# Patient Record
Sex: FEMALE | Race: BLACK OR AFRICAN AMERICAN | NOT HISPANIC OR LATINO | Employment: UNEMPLOYED | URBAN - METROPOLITAN AREA
[De-identification: names, ages, dates, MRNs, and addresses within clinical notes are randomized per-mention and may not be internally consistent; named-entity substitution may affect disease eponyms.]

---

## 2020-08-17 ENCOUNTER — TELEPHONE (OUTPATIENT)
Dept: NEUROLOGY | Facility: CLINIC | Age: 35
End: 2020-08-17

## 2020-08-17 NOTE — TELEPHONE ENCOUNTER
----- Message from Robert Nieves sent at 8/14/2020  4:59 PM CDT -----  Regarding: appointment access  Ms. Cosby called in to speak with someone at the office regarding scheduling an appointment. She would like a call back from the office and can be reached at    [phone]

## 2020-12-29 ENCOUNTER — OFFICE VISIT (OUTPATIENT)
Dept: NEUROLOGY | Facility: CLINIC | Age: 35
End: 2020-12-29
Payer: MEDICAID

## 2020-12-29 VITALS
BODY MASS INDEX: 23.82 KG/M2 | DIASTOLIC BLOOD PRESSURE: 82 MMHG | WEIGHT: 129.44 LBS | HEIGHT: 62 IN | HEART RATE: 72 BPM | RESPIRATION RATE: 16 BRPM | SYSTOLIC BLOOD PRESSURE: 116 MMHG

## 2020-12-29 DIAGNOSIS — G93.5 CHIARI MALFORMATION TYPE I: Primary | ICD-10-CM

## 2020-12-29 DIAGNOSIS — M62.838 CERVICAL PARASPINAL MUSCLE SPASM: ICD-10-CM

## 2020-12-29 PROCEDURE — 99204 OFFICE O/P NEW MOD 45 MIN: CPT | Mod: S$PBB,,, | Performed by: PSYCHIATRY & NEUROLOGY

## 2020-12-29 PROCEDURE — 99999 PR PBB SHADOW E&M-EST. PATIENT-LVL IV: CPT | Mod: PBBFAC,,, | Performed by: PSYCHIATRY & NEUROLOGY

## 2020-12-29 PROCEDURE — 99214 OFFICE O/P EST MOD 30 MIN: CPT | Mod: PBBFAC | Performed by: PSYCHIATRY & NEUROLOGY

## 2020-12-29 PROCEDURE — 99204 PR OFFICE/OUTPT VISIT, NEW, LEVL IV, 45-59 MIN: ICD-10-PCS | Mod: S$PBB,,, | Performed by: PSYCHIATRY & NEUROLOGY

## 2020-12-29 PROCEDURE — 99999 PR PBB SHADOW E&M-EST. PATIENT-LVL IV: ICD-10-PCS | Mod: PBBFAC,,, | Performed by: PSYCHIATRY & NEUROLOGY

## 2020-12-29 RX ORDER — ACETAMINOPHEN AND CODEINE PHOSPHATE 120; 12 MG/5ML; MG/5ML
1 SOLUTION ORAL DAILY
COMMUNITY

## 2020-12-29 NOTE — PATIENT INSTRUCTIONS
Neck Spasm     A spasm of the neck muscles can happen after a sudden awkward neck movement. Sleeping with your neck in a crooked position can also cause spasm. Some people respond to emotional stress by tensing the muscles of their neck, shoulders, and upper back. If neck spasm lasts long enough, it can cause headache.  The treatment described below will usually help the pain to go away in 5 to 7 days. Pain that continues may need further evaluation or other types of treatment such as physical therapy.  Home care  · Rest and relax the muscles. Use a comfortable pillow that supports the head and keeps the spine in a neutral position. The position of the head should not be tilted forward or backward. A rolled up towel may help for a custom fit.  · Some people find relief with heat. Heat can be applied with either a warm shower or bath or a moist towel heated in the microwave and massage. Others prefer cold packs. You can make an ice pack by filling a plastic bag that seals at the top with ice cubes or crushed ice and then wrapping it with a thin towel. Try both and use the method that feels best for 15 to 20 minutes, several times a day.  · Whether using ice or heat, be careful that you do not injure your skin. Never put ice directly on the skin. Always wrap the ice in a towel or other type of cloth. This is very important, especially in people with poor skin sensations.  · Try to reduce your stress level. Emotional stress can lead to neck muscle tension and get in the way of or delay the healing process.  · You may use over-the-counter pain medicine to control pain, unless another medicine was prescribed.If you have chronic liver or kidney disease or ever had a stomach ulcer or GI bleeding, talk with your healthcare provider before using these medicines.  Follow-up care  Follow up with your healthcare provider if your symptoms do not show signs of improvement after one week. Physical therapy or further tests may be  needed.  If X-rays, CT scans, or MRI scans were taken, you will be told of any new findings that may affect your care.  Call 911  Call 911 if you have:  · Sudden weakness or numbness in one or both arms  · Neck swelling, difficulty or painful swallowing  · Difficulty breathing  · Chest pain  When to seek medical advice  Call your healthcare provider right away if any of these occur:  · Pain becomes worse or spreads into one or both arms  · Increasing headache with nausea or vomiting  · Fever of 100.4°F (38°C) or above lasting for 24 to 48 hours  Date Last Reviewed: 11/21/2015  © 9128-6646 [employer]. [address] [place] [address], [place] [address]. All rights reserved. This information is not intended as a substitute for professional medical care. Always follow your healthcare professional's instructions.
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
## 2021-04-28 ENCOUNTER — PATIENT MESSAGE (OUTPATIENT)
Dept: RESEARCH | Facility: HOSPITAL | Age: 36
End: 2021-04-28

## 2021-12-06 DIAGNOSIS — M25.539 PAIN IN WRIST, UNSPECIFIED LATERALITY: Primary | ICD-10-CM

## 2021-12-08 ENCOUNTER — OFFICE VISIT (OUTPATIENT)
Dept: ORTHOPEDICS | Facility: CLINIC | Age: 36
End: 2021-12-08
Payer: MEDICAID

## 2021-12-08 ENCOUNTER — TELEPHONE (OUTPATIENT)
Dept: PHYSICAL MEDICINE AND REHAB | Facility: CLINIC | Age: 36
End: 2021-12-08
Payer: MEDICAID

## 2021-12-08 ENCOUNTER — HOSPITAL ENCOUNTER (OUTPATIENT)
Dept: RADIOLOGY | Facility: HOSPITAL | Age: 36
Discharge: HOME | End: 2021-12-08
Attending: STUDENT IN AN ORGANIZED HEALTH CARE EDUCATION/TRAINING PROGRAM
Payer: MEDICAID

## 2021-12-08 VITALS — BODY MASS INDEX: 23.82 KG/M2 | RESPIRATION RATE: 20 BRPM | HEIGHT: 62 IN | WEIGHT: 129.44 LBS

## 2021-12-08 DIAGNOSIS — M67.431 GANGLION CYST OF VOLAR ASPECT OF RIGHT WRIST: ICD-10-CM

## 2021-12-08 DIAGNOSIS — M25.539 PAIN IN WRIST, UNSPECIFIED LATERALITY: ICD-10-CM

## 2021-12-08 DIAGNOSIS — G56.01 CARPAL TUNNEL SYNDROME OF RIGHT WRIST: Primary | ICD-10-CM

## 2021-12-08 PROCEDURE — 99204 OFFICE O/P NEW MOD 45 MIN: CPT | Mod: S$PBB,,, | Performed by: STUDENT IN AN ORGANIZED HEALTH CARE EDUCATION/TRAINING PROGRAM

## 2021-12-08 PROCEDURE — 99999 PR PBB SHADOW E&M-EST. PATIENT-LVL III: ICD-10-PCS | Mod: PBBFAC,,, | Performed by: STUDENT IN AN ORGANIZED HEALTH CARE EDUCATION/TRAINING PROGRAM

## 2021-12-08 PROCEDURE — 73110 X-RAY EXAM OF WRIST: CPT | Mod: 26,RT,, | Performed by: RADIOLOGY

## 2021-12-08 PROCEDURE — 99213 OFFICE O/P EST LOW 20 MIN: CPT | Mod: PBBFAC,PO | Performed by: STUDENT IN AN ORGANIZED HEALTH CARE EDUCATION/TRAINING PROGRAM

## 2021-12-08 PROCEDURE — 73110 X-RAY EXAM OF WRIST: CPT | Mod: TC,PO,RT

## 2021-12-08 PROCEDURE — 99999 PR PBB SHADOW E&M-EST. PATIENT-LVL III: CPT | Mod: PBBFAC,,, | Performed by: STUDENT IN AN ORGANIZED HEALTH CARE EDUCATION/TRAINING PROGRAM

## 2021-12-08 PROCEDURE — 73110 XR WRIST COMPLETE 3 VIEWS RIGHT: ICD-10-PCS | Mod: 26,RT,, | Performed by: RADIOLOGY

## 2021-12-08 PROCEDURE — 99204 PR OFFICE/OUTPT VISIT, NEW, LEVL IV, 45-59 MIN: ICD-10-PCS | Mod: S$PBB,,, | Performed by: STUDENT IN AN ORGANIZED HEALTH CARE EDUCATION/TRAINING PROGRAM

## 2021-12-20 ENCOUNTER — OFFICE VISIT (OUTPATIENT)
Dept: PHYSICAL MEDICINE AND REHAB | Facility: CLINIC | Age: 36
End: 2021-12-20
Payer: MEDICAID

## 2021-12-20 VITALS
WEIGHT: 129 LBS | BODY MASS INDEX: 23.74 KG/M2 | SYSTOLIC BLOOD PRESSURE: 119 MMHG | HEIGHT: 62 IN | HEART RATE: 66 BPM | DIASTOLIC BLOOD PRESSURE: 83 MMHG | RESPIRATION RATE: 14 BRPM

## 2021-12-20 DIAGNOSIS — M79.18 CERVICAL MYOFASCIAL PAIN SYNDROME: ICD-10-CM

## 2021-12-20 DIAGNOSIS — R20.2 PARESTHESIAS: ICD-10-CM

## 2021-12-20 PROCEDURE — 99203 PR OFFICE/OUTPT VISIT, NEW, LEVL III, 30-44 MIN: ICD-10-PCS | Mod: 25,S$PBB,, | Performed by: PHYSICAL MEDICINE & REHABILITATION

## 2021-12-20 PROCEDURE — 95912 PR NERVE CONDUCTION STUDY; 11 -12 STUDIES: ICD-10-PCS | Mod: 26,S$PBB,, | Performed by: PHYSICAL MEDICINE & REHABILITATION

## 2021-12-20 PROCEDURE — 99203 OFFICE O/P NEW LOW 30 MIN: CPT | Mod: 25,S$PBB,, | Performed by: PHYSICAL MEDICINE & REHABILITATION

## 2021-12-20 PROCEDURE — 99999 PR PBB SHADOW E&M-EST. PATIENT-LVL III: ICD-10-PCS | Mod: PBBFAC,,, | Performed by: PHYSICAL MEDICINE & REHABILITATION

## 2021-12-20 PROCEDURE — 95912 NRV CNDJ TEST 11-12 STUDIES: CPT | Mod: PBBFAC | Performed by: PHYSICAL MEDICINE & REHABILITATION

## 2021-12-20 PROCEDURE — 95912 NRV CNDJ TEST 11-12 STUDIES: CPT | Mod: 26,S$PBB,, | Performed by: PHYSICAL MEDICINE & REHABILITATION

## 2021-12-20 PROCEDURE — 99213 OFFICE O/P EST LOW 20 MIN: CPT | Mod: PBBFAC | Performed by: PHYSICAL MEDICINE & REHABILITATION

## 2021-12-20 PROCEDURE — 99999 PR PBB SHADOW E&M-EST. PATIENT-LVL III: CPT | Mod: PBBFAC,,, | Performed by: PHYSICAL MEDICINE & REHABILITATION

## 2021-12-20 RX ORDER — AZELASTINE 1 MG/ML
1 SPRAY, METERED NASAL
COMMUNITY
Start: 2021-11-05

## 2021-12-20 RX ORDER — LEVOCETIRIZINE DIHYDROCHLORIDE 5 MG/1
5 TABLET, FILM COATED ORAL NIGHTLY
COMMUNITY
Start: 2021-11-05

## 2021-12-20 RX ORDER — FLUTICASONE PROPIONATE 50 MCG
2 SPRAY, SUSPENSION (ML) NASAL DAILY
COMMUNITY
Start: 2021-11-05

## 2023-09-26 ENCOUNTER — PATIENT MESSAGE (OUTPATIENT)
Dept: SPORTS MEDICINE | Facility: CLINIC | Age: 38
End: 2023-09-26
Payer: MEDICAID

## 2023-09-26 ENCOUNTER — TELEPHONE (OUTPATIENT)
Dept: SPORTS MEDICINE | Facility: CLINIC | Age: 38
End: 2023-09-26
Payer: MEDICAID

## 2023-09-26 NOTE — TELEPHONE ENCOUNTER
Called pt, got her scheduled with Dr. Harper as requested. Pt verbally understood and accepted appointment.   ----- Message from Elsy Bruce sent at 9/26/2023 10:06 AM CDT -----  Contact: LION [phone]  Patient is returning a phone call.    Who left a message for the patient: --Nurse--    Does patient know what this is regarding: --appointment--    Would you like a call back, or a response through your [facility] portal?:  --Call back--    Comments: Pt states that she seen the doctor before. Please call to advise.

## 2023-10-02 DIAGNOSIS — M25.561 RIGHT KNEE PAIN, UNSPECIFIED CHRONICITY: Primary | ICD-10-CM

## 2023-10-04 ENCOUNTER — OFFICE VISIT (OUTPATIENT)
Dept: SPORTS MEDICINE | Facility: CLINIC | Age: 38
End: 2023-10-04
Payer: MEDICAID

## 2023-10-04 ENCOUNTER — HOSPITAL ENCOUNTER (OUTPATIENT)
Dept: RADIOLOGY | Facility: HOSPITAL | Age: 38
Discharge: HOME | End: 2023-10-04
Attending: STUDENT IN AN ORGANIZED HEALTH CARE EDUCATION/TRAINING PROGRAM
Payer: MEDICAID

## 2023-10-04 VITALS — BODY MASS INDEX: 23.74 KG/M2 | WEIGHT: 129 LBS | HEIGHT: 62 IN

## 2023-10-04 DIAGNOSIS — M25.561 RIGHT KNEE PAIN, UNSPECIFIED CHRONICITY: ICD-10-CM

## 2023-10-04 DIAGNOSIS — R29.898 WEAKNESS OF BOTH HIPS: ICD-10-CM

## 2023-10-04 DIAGNOSIS — M22.2X1 PATELLOFEMORAL DISORDER, RIGHT: Primary | ICD-10-CM

## 2023-10-04 PROCEDURE — 73562 XR KNEE ORTHO RIGHT WITH FLEXION: ICD-10-PCS | Mod: 26,LT,, | Performed by: RADIOLOGY

## 2023-10-04 PROCEDURE — 1159F PR MEDICATION LIST DOCUMENTED IN MEDICAL RECORD: ICD-10-PCS | Mod: CPTII,,, | Performed by: STUDENT IN AN ORGANIZED HEALTH CARE EDUCATION/TRAINING PROGRAM

## 2023-10-04 PROCEDURE — 73564 X-RAY EXAM KNEE 4 OR MORE: CPT | Mod: TC,RT

## 2023-10-04 PROCEDURE — 73564 X-RAY EXAM KNEE 4 OR MORE: CPT | Mod: 26,RT,, | Performed by: RADIOLOGY

## 2023-10-04 PROCEDURE — 73562 X-RAY EXAM OF KNEE 3: CPT | Mod: 26,LT,, | Performed by: RADIOLOGY

## 2023-10-04 PROCEDURE — 1159F MED LIST DOCD IN RCRD: CPT | Mod: CPTII,,, | Performed by: STUDENT IN AN ORGANIZED HEALTH CARE EDUCATION/TRAINING PROGRAM

## 2023-10-04 PROCEDURE — 99213 OFFICE O/P EST LOW 20 MIN: CPT | Mod: PBBFAC | Performed by: STUDENT IN AN ORGANIZED HEALTH CARE EDUCATION/TRAINING PROGRAM

## 2023-10-04 PROCEDURE — 99214 OFFICE O/P EST MOD 30 MIN: CPT | Mod: S$PBB,,, | Performed by: STUDENT IN AN ORGANIZED HEALTH CARE EDUCATION/TRAINING PROGRAM

## 2023-10-04 PROCEDURE — 99214 PR OFFICE/OUTPT VISIT, EST, LEVL IV, 30-39 MIN: ICD-10-PCS | Mod: S$PBB,,, | Performed by: STUDENT IN AN ORGANIZED HEALTH CARE EDUCATION/TRAINING PROGRAM

## 2023-10-04 PROCEDURE — 3008F PR BODY MASS INDEX (BMI) DOCUMENTED: ICD-10-PCS | Mod: CPTII,,, | Performed by: STUDENT IN AN ORGANIZED HEALTH CARE EDUCATION/TRAINING PROGRAM

## 2023-10-04 PROCEDURE — 99999 PR PBB SHADOW E&M-EST. PATIENT-LVL III: ICD-10-PCS | Mod: PBBFAC,,, | Performed by: STUDENT IN AN ORGANIZED HEALTH CARE EDUCATION/TRAINING PROGRAM

## 2023-10-04 PROCEDURE — 3008F BODY MASS INDEX DOCD: CPT | Mod: CPTII,,, | Performed by: STUDENT IN AN ORGANIZED HEALTH CARE EDUCATION/TRAINING PROGRAM

## 2023-10-04 PROCEDURE — 73564 XR KNEE ORTHO RIGHT WITH FLEXION: ICD-10-PCS | Mod: 26,RT,, | Performed by: RADIOLOGY

## 2023-10-04 PROCEDURE — 99999 PR PBB SHADOW E&M-EST. PATIENT-LVL III: CPT | Mod: PBBFAC,,, | Performed by: STUDENT IN AN ORGANIZED HEALTH CARE EDUCATION/TRAINING PROGRAM

## 2023-10-04 NOTE — PATIENT INSTRUCTIONS
"Assessment:  Mercedes Howard is a 38 y.o. female   Chief Complaint   Patient presents with    Right Knee - Pain       Encounter Diagnoses   Name Primary?    Patellofemoral disorder, right Yes    Weakness of both hips         Plan:  Reviewed your x-rays with you today and discussed pertinent findings.   We have reviewed the natural history of this disorder and discussed treatment and management options moving forward   PT at [facility]  An ambulatory referral to physical therapy was placed within the [facility] system today. You should expect a phone call within the next few days from Centralized Scheduling. If you do not hear from them, please reach out to the PT department directly at [phone].      Patellofemoral Pain Syndrome    This information does not include all information related to this topic. For more information please visit the American Academy of Orthopaedic Surgeons website using the following link: Patellofemoral Pain Syndrome    Patellofemoral pain syndrome (PFPS) is a broad term used to describe pain in the front of the knee and around the patella, or kneecap. It is sometimes called "runner's knee" or "jumper's knee" because it is common in people who participate in sports--particularly females and young adults--but PFPS can occur in nonathletes, as well. The pain and stiffness caused by PFPS can make it difficult to climb stairs, kneel down, and perform other everyday activities.    Many things may contribute to the development of PFPS. Problems with the alignment of the kneecap and overuse from vigorous athletics or training are often significant factors.    Symptoms are often relieved with conservative treatment, such as changes in activity levels or a therapeutic exercise program.    Anatomy  Your knee is the largest joint in your body and one of the most complex. It is made up of the lower end of the femur (thighbone), the upper end of the tibia (shinbone), and the patella " (kneecap).    Ligaments and tendons connect the femur to the bones of the lower leg. The four main ligaments in the knee attach to the bones and act like strong ropes to hold the bones together.    Muscles are connected to bones by tendons. The quadriceps tendon connects the muscles in the front of the thigh to the patella. Segments of the quadriceps tendon--called the patellar retinacula--attach to the tibia and help to stabilize the patella. Stretching from your patella to your tibia is the patellar tendon.    Several structures in the knee joint make movement easier. For example, the patella rests in a groove on the top of the femur called the trochlea. When you bend or straighten your knee, the patella moves back and forth inside this trochlear groove.    A slippery substance called articular cartilage covers the ends of the femur, trochlear groove, and the underside of the patella. Articular cartilage helps your bones glide smoothly against each other as you move your leg.    Also aiding in movement is the synovium--a thin lining of tissue that covers the surface of the joint. The synovium produces a small amount of fluid that lubricates the cartilage. In addition, just below the kneecap is a small pad of fat that cushions the kneecap and acts as a shock absorber.        Description  Patellofemoral pain syndrome occurs when nerves sense pain in the soft tissues and bone around the kneecap. These soft tissues include the tendons, the fat pad beneath the patella, and the synovial tissue that lines the knee joint.    In some cases of patellofemoral pain, a condition called chondromalacia patella is present. Chondromalacia patella is the softening and breakdown of the articular cartilage on the underside of the kneecap. There are no nerves in articular cartilage--so damage to the cartilage itself cannot directly cause pain. It can, however, lead to inflammation of the synovium and pain in the underlying  bone.    Cause    Overuse  In many cases, PFPS is caused by vigorous physical activities that put repeated stress on the knee --such as jogging, squatting, and climbing stairs. It can also be caused by a sudden change in physical activity. This change can be in the frequency of activity--such as increasing the number of days you exercise each week. It can also be in the duration or intensity of activity--such as running longer distances.    Other factors that may contribute to patellofemoral pain include:  Use of improper sports training techniques or equipment  Changes in footwear or playing surface    Patellar Malalignment  Patellofemoral pain syndrome can also be caused by abnormal tracking of the kneecap in the trochlear groove. In this condition, the patella is pushed out to one side of the groove when the knee is bent. This abnormality may cause increased pressure between the back of the patella and the trochlea, irritating soft tissues.    Factors that contribute to poor tracking of the kneecap include:  Problems with the alignment of the legs between the hips and the ankles. Problems in alignment may result in a kneecap that shifts too far toward the outside or inside of the leg, or one that rides too high in the trochlear groove--a condition called patella kanu.  Muscular imbalances or weaknesses, especially in the quadriceps muscles at the front of the thigh. When the knee bends and straightens, the quadriceps muscles and quadriceps tendon help to keep the kneecap within the trochlear groove. Weak or imbalanced quadriceps can cause poor tracking of the kneecap within the groove.        Symptoms  The most common symptom of PFPS is a dull, aching pain in the front of the knee. This pain--which usually begins gradually and is frequently activity-related--may be present in one or both knees. Other common symptoms include:  Pain during exercise and activities that repeatedly bend the knee, such as climbing  stairs, running, jumping, or squatting.  Pain on the front of the knee after sitting for a long period of time with your knees bent, such as one does in a movie theater or when riding on an airplane.  Pain related to a change in activity level or intensity, playing surface, or equipment.  Popping or crackling sounds in your knee when climbing stairs or when standing up after prolonged sitting.    Home Remedies  In many cases, patellofemoral pain will improve with simple home treatment.    Activity Changes  Stop doing the activities that make your knee hurt until your pain is resolved. This may mean changing your training routine or switching to low-impact activities--such as riding a stationary bike, using an elliptical machine, or swimming--that will place less stress on your knee joint. If you are overweight, losing weight will also help to reduce pressure on your knee.    The PEACE & LOVE Method          If your pain persists or it becomes more difficult to move your knee, contact your doctor for a thorough evaluation.      Links  Patellofemoral Pain Syndrome  (https://orthoinfo.aaos.org/en/diseases--conditions/patellofemoral-pain-syndrome/)  Knee Conditioning Program  (https://orthoinfo.aaos.org/globalassets/pdfs/2017-rehab_knee.pdf)      Follow-up: As needed or sooner if there are any problems between now and then.    Thank you for choosing [facility] and Dr. Zackery Harper for your orthopedic & sports medicine care. It is our goal to provide you with exceptional care that will help keep you healthy, active, and get you back in the game.    Please do not hesitate to reach out to us via email, phone, or [email or web address] with any questions, concerns, or feedback.    If you felt that you received exemplary care today, please consider leaving us feedback on [employer] at:  [email or web address]    If you are experiencing pain/discomfort ,or have questions after  5pm and would like to be connected to the [facility] on-call team, please call this number and specify which Sports Medicine provider is treating you: [phone]

## 2023-10-04 NOTE — PROGRESS NOTES
"        Patient ID: Mercedes Howard  YOB: 1985  MRN: [id number]    Chief Complaint: Pain of the Right Knee    Referred By: Self for right knee pain    History of Present Illness: Mercedes Howard is a 38 y.o. female who presents today with right knee pain. Previous patient of mine for carpal tunnel.     The patient is active in  resistance training, cardio .    Mercedes Howard states it is Chronic in nature and there was not a specific mechanism. Years of knee pain dating back to childhood. No recent or old falls trauma, or twist injuries. Was flared up last week but this has ceased.  Mercedes Howard describes the pain as a intermittent anterior knee. Treatment to date includes brace, activity modification, OTCs. They believe that they are unchanged with this treatment. Current pain level at rest is 2/10, pain level at worst is 8/10  (Numeric Pain Rating Scale).  Associated symptoms include: Swelling No, Instability No, Pain that affects your sleep Yes, Mechanical Yes, locking/catching No, Neurological No, limited range of motion No.Aggravating activities include walking, night. They denies formal physical therapy for this.    No results found for: "HGBA1C"    Past Medical History:   No past medical history on file.  No past surgical history on file.  Family History   Problem Relation Age of Onset    No Known Problems Mother     Hypertension Father      Social History     Socioeconomic History    Marital status: Single   Tobacco Use    Smoking status: Never    Smokeless tobacco: Never   Substance and Sexual Activity    Alcohol use: Yes     Comment: social    Drug use: Never    Sexual activity: Yes     Partners: Male     Medication List with Changes/Refills   Current Medications    AZELASTINE (ASTELIN) 137 MCG (0.1 %) NASAL SPRAY    1 spray.    FLUTICASONE PROPIONATE (FLONASE) 50 MCG/ACTUATION NASAL SPRAY    2 sprays by Each Nostril route once daily.    LEVOCETIRIZINE (XYZAL) 5 MG " TABLET    Take 5 mg by mouth nightly.    NORETHINDRONE (MICRONOR) 0.35 MG TABLET    Take 1 tablet by mouth once daily.     Review of patient's allergies indicates:  No Known Allergies    Physical Exam:   Body mass index is 23.59 kg/m².    GENERAL: Well appearing, in no acute distress.  HEAD: Normocephalic and atraumatic.  ENT: External ears and nose grossly normal.  EYES: EOMI bilaterally  PULMONARY: Respirations are grossly even and non-labored.  NEURO: Awake, alert, and oriented x 3.  SKIN: No obvious rashes appreciated.  PSYCH: Mood & affect are appropriate.    Detailed MSK exam:     No obvious deformities, no ecchymosis, no erythema. No effusion. Full ROM. Tender to palpation at medial patella facet, medial femoral condyle, medial joint line . Patellar Apprehension negative. Crepitus, limited patellar mobility.   Ligamentous exam: Lachman negative. Valgus @ 0 negative. Valgus @ 30 negative. Varus negative. Anterior drawer negative. Posterior Drawer negative. Mensicus exam: Thessaly negative, Pain with maximal passive knee flexion negative, Pain/click Jennifer positive, Pain with forced hyperextension negative, Hx of catching/locking reported negative, Joint line tenderness positive. Anterior knee pain assessment: Single leg decline squat Diffuse pain at patellofemoral distribution. Bilateral hip drop with trendelenburg.     Imaging:  X-ray Knee Ortho Right with Flexion  Narrative: EXAMINATION:  XR KNEE ORTHO RIGHT WITH FLEXION    CLINICAL HISTORY:  Pain in right knee    TECHNIQUE:  AP standing as well as PA flexion standing and Merchant views of both knees were performed.  A lateral view of the right knee is also performed.    COMPARISON:  None.    FINDINGS:  No acute abnormality with minimal early bilateral medial compartment marginal osteophyte changes.  Joint spaces maintained.  Impression: As above    Electronically signed by: Sebastian Coburn MD  Date:    10/04/2023  Time:    08:41      Relevant imaging  results were reviewed and interpreted by me and per my read as above.  This was discussed with the patient and / or family today.     Assessment:  Mercedes Howard is a 38 y.o. female presents today for right greater than left patellofemoral pain.  Has significant weakness on exam today of her glute medius right greater than left.  Discussed the role of formal strengthening and that she is been in this for 20 years.  Otherwise her knees feels structurally sound and x-rays are normal.  Discussed over-the-counter anti-inflammatories activity modification and formal physical therapy.  PT in [place], follow-up with me in 3 months or sooner if needed.    Patellofemoral disorder, right    Weakness of both hips         A copy of today's visit note has been sent to the referring provider.       Zackery Harper MD    Disclaimer: This note was prepared using a voice recognition system and is likely to have sound alike errors within the text.      Yes-Patient/Caregiver accepts free interpretation services...

## 2023-11-07 ENCOUNTER — CLINICAL SUPPORT (OUTPATIENT)
Dept: REHABILITATION | Facility: HOSPITAL | Age: 38
End: 2023-11-07
Attending: STUDENT IN AN ORGANIZED HEALTH CARE EDUCATION/TRAINING PROGRAM
Payer: MEDICAID

## 2023-11-07 DIAGNOSIS — M25.561 CHRONIC PAIN OF RIGHT KNEE: ICD-10-CM

## 2023-11-07 DIAGNOSIS — R29.898 WEAKNESS OF BOTH HIPS: ICD-10-CM

## 2023-11-07 DIAGNOSIS — M22.2X1 PATELLOFEMORAL DISORDER, RIGHT: ICD-10-CM

## 2023-11-07 DIAGNOSIS — G89.29 CHRONIC PAIN OF RIGHT KNEE: ICD-10-CM

## 2023-11-07 PROCEDURE — 97110 THERAPEUTIC EXERCISES: CPT | Mod: PN

## 2023-11-07 PROCEDURE — 97162 PT EVAL MOD COMPLEX 30 MIN: CPT | Mod: PN

## 2023-11-07 NOTE — PLAN OF CARE
[facility]  Physical Therapy Initial Evaluation     Date: 11/7/2023   Name: Mercedes Howard  Clinic Number: [id number]    Therapy Diagnosis:   Encounter Diagnoses   Name Primary?    Patellofemoral disorder, right     Weakness of both hips     Chronic pain of right knee      Physician: Zackery Harper MD    Physician Orders: PT Eval and Treat  Medical Diagnosis from Referral:   M22.2X1 (ICD-10-CM) - Patellofemoral disorder, right   R29.898 (ICD-10-CM) - Weakness of both hips   Evaluation Date: 11/7/2023  Authorization Period Expiration: 10/3/2024  Plan of Care Expiration: 1/7/2024  Progress Note Due: 12/7/2023  Visit # / Visits authorized: 1/1   FOTO: 1/3     Time In: 11:00am  Time Out: 11:45am  Total Appointment Time (timed & untimed codes): 45 minutes    Precautions: Standard    Surgery: None    SUBJECTIVE   Date of onset: 2 months ago  History of current condition - Mercedes reports: She has been having right knee pain for the last couple years. She states the pain is on and off and she can't really figure out what causes it. She states 2 months ago it was very bad but it has gotten a little better. She states bending down, going down stairs, and working out all bother her knee. She states she typically works out 3 times a week but that has changed some if the knee others her.      Imaging, x-ray: reviewed     Prior Therapy: none  Social History: lives with their family  Occupation: [employer]  Prior Level of Function: walking, working out, ADLs  Current Level of Function: light walking and limited with workouts    Pain:  Current 0/10, worst 8/10, best 0/10   Location: right knee   Description: Aching and Dull  Aggravating Factors: Bending, Walking, and Lifting  Easing Factors: rest    Pts goals: decrease pain     Medical History:   No past medical history on file.    Surgical History:   Mercedes Howard  has no past surgical history on file.    Medications:   Mercedes  has a current medication list which includes the following prescription(s): azelastine, fluticasone propionate, levocetirizine, and norethindrone.    Allergies:   Review of patient's allergies indicates:  No Known Allergies     OBJECTIVE     Sensation:  Sensation is intact to light touch    (WFL: Within Functional Limits)     ROM   Right (degrees) Left (degrees)   Knee Flexion (140) 130 tension 130   Knee Extension (0) 0 0     Joint Mobility   Right Left    Tibial Posterior Glide  Normal Normal   Tibial Anterior Glide  Normal Normal   Patellar Superior Glide  Normal Normal   Patellar Inferior Glide  Normal Normal   Patellar Medial Glide  Normal Normal   Patellar Lateral Glide  Normal Normal     Strength   Right    Left   Gluteus Randolph 4+/5 4+/5   Gluteus Medius 4/5 4/5   Psoas 4+/5 4+/5   Quadriceps 4+/5 4+/5   Hamstrings 4+/5 4+/5   Anterior Tibialis 5/5 5/5   Gastroc/Soleus 5/5 5/5   Transverse Abdominis good     Special Tests:   Test Right Left   Mary negative negative   Thessaly's negative negative   Anterior Drawer  negative negative   Posterior Drawer  negative negative   Valgus Stress Test negative negative   Varus Stress Test negative negative   Noble Compression Test negative negative     Muscle Length: normal hamstring length         Palpation: tender at right patella tendon     Movement Analysis:   Test Right Left   Squat Pain with deeper knee flexion    Heel Tap/Step Up Limited with pain Normal   Single Leg Balance Normal Normal   Single Leg Heel Raise Normal Normal     Gait Analysis: The patient ambulated with the use of none and presents with the following gait abnormalities: trendelenburg    Function:     Intake Outcome Measure for FOTO Knee Survey    Therapist reviewed FOTO scores for Mercedes Taydianakei Lee on 11/7/2023.   FOTO documents entered into [employer] - see Media section.    Intake Score: 99%       TREATMENT     Total Treatment time (time-based codes) separate from Evaluation: 10 minutes      Mercedes received the treatments listed below:      therapeutic exercises to develop strength, endurance, ROM, flexibility, posture, and core stabilization for 10 minutes including:  HEP education  Workout education     manual therapy techniques: Joint mobilizations, Myofacial release, and Soft tissue Mobilization were applied to the: right knee for 0 minutes, including:    PATIENT EDUCATION AND HOME EXERCISES     Education provided:   - HEP daily    Written Home Exercises Provided: yes. Exercises were reviewed and Mercedes was able to demonstrate them prior to the end of the session.  Mercedes demonstrated good  understanding of the education provided. See EMR under Patient Instructions for exercises provided during therapy sessions.    ASSESSMENT   Mercedes is a 38 y.o. female referred to outpatient Physical Therapy with a medical diagnosis of   M22.2X1 (ICD-10-CM) - Patellofemoral disorder, right   R29.898 (ICD-10-CM) - Weakness of both hips   . The patient presents with impairments which include impairments list: ROM, strength, endurance, balance, posture, gait mechanics, core strength and stability, functional movement patterns, and coordination.  These impairments are limiting patient's ability to walk long distances, go up and down stairs, workout fully, and jog. Pt prognosis is Good due to personal factors and co-morbidities listed below. Pt will benefit from skilled outpatient Physical Therapy to address the deficits stated above and in the chart below, provide pt/family education, and to maximize pt's level of independence.      Plan of care discussed with patient: Yes  Pt's spiritual, cultural and educational needs considered and patient is agreeable to the plan of care and goals as stated below:     Anticipated Barriers for therapy: co-morbidities     Medical Necessity is demonstrated by the following  History  Co-morbidities and personal factors that may impact the plan of care [] LOW: no personal  factors / co-morbidities  [x] MODERATE: 1-2 personal factors / co-morbidities  [] HIGH: 3+ personal factors / co-morbidities    Moderate / High Support Documentation:   Co-morbidities affecting plan of care: coping style/mechanism, young age, and chronic stage of condition    Personal Factors:   age  coping style  lifestyle     Examination  Body Structures and Functions, activity limitations and participation restrictions that may impact the plan of care [] LOW: addressing 1-2 elements  [x] MODERATE: 3+ elements  [] HIGH: 4+ elements (please support below)    Moderate / High Support Documentation:     Participation Restrictions:   ADLs  Workouts     Mobility  walking    Self care  no deficits    Life Areas  employment    Community and Social Life  community life  recreation and leisure     Clinical Presentation [] LOW: stable  [x] MODERATE: Evolving  [] HIGH: Unstable     Decision Making/ Complexity Score: moderate       Goals:  Short Term Goals: 4 weeks   1. Patient will improve right knee flexion to 130 degrees pain free in order to workout fully.  2. Patient will be independent with HEP in order to further progress and return to maximal function.  3. Pain rating at Worst: 4/10 in order to progress towards increased independence with activity.    Long Term Goals: 8 weeks   1. Patient will improve glute med strength to 4+/5 in order to go up and down stairs.  2. Patient will improve psoas strength to 5/5 in order to jog.  3. Patient will demonstrate normal gait mechanics in order to minimize any compensation and return to PLOF.   4. Patient will self report improvement to 99% on the FOTO Knee Survey.     PLAN   Plan of care Certification: 11/7/2023 to 1/7/2024.    Outpatient Physical Therapy 2 times weekly for 8 weeks to include the following interventions: Gait Training, Manual Therapy, Moist Heat/ Ice, Neuromuscular Re-ed, Patient Education, Self Care, Therapeutic Activities, Therapeutic Exercise, and Functional  Dry Needling .     John MONTALVO Lui, PT, DPT     CERTIFY THE NEED FOR THESE SERVICES FURNISHED UNDER THIS PLAN OF TREATMENT AND WHILE UNDER MY CARE   Physician's comments:     Physician's Signature: ___________________________________________________

## 2023-11-14 ENCOUNTER — CLINICAL SUPPORT (OUTPATIENT)
Dept: REHABILITATION | Facility: HOSPITAL | Age: 38
End: 2023-11-14
Payer: MEDICAID

## 2023-11-14 DIAGNOSIS — R29.898 WEAKNESS OF BOTH HIPS: ICD-10-CM

## 2023-11-14 DIAGNOSIS — G89.29 CHRONIC PAIN OF RIGHT KNEE: Primary | ICD-10-CM

## 2023-11-14 DIAGNOSIS — M25.561 CHRONIC PAIN OF RIGHT KNEE: Primary | ICD-10-CM

## 2023-11-14 PROCEDURE — 97110 THERAPEUTIC EXERCISES: CPT | Mod: PN

## 2023-11-14 NOTE — PROGRESS NOTES
[facility]   Physical Therapy Treatment Note     Name: Mercedes Rosales [place]  Clinic Number: [id number]    Therapy Diagnosis:   Encounter Diagnoses   Name Primary?    Chronic pain of right knee Yes    Weakness of both hips      Physician: Zackery Harper MD    Visit Date: 11/14/2023    Physician Orders: PT Eval and Treat  Medical Diagnosis from Referral:   M22.2X1 (ICD-10-CM) - Patellofemoral disorder, right   R29.898 (ICD-10-CM) - Weakness of both hips   Evaluation Date: 11/7/2023  Authorization Period Expiration: 12/13/2023  Plan of Care Expiration: 1/7/2024  Progress Note Due: 12/7/2023  Visit # / Visits authorized: 1/12 (+1 eval)  FOTO: 1/3     Time In: 9:00am  Time Out: 10:00am  Total Billable Time: 55 minutes    Precautions: Standard    SUBJECTIVE     Pt reports: She is doing alright today.  She was compliant with home exercise program.  Response to previous treatment: good  Functional change: none noted    Pain:  Current 0/10, worst 8/10, best 0/10   Location: right knee    OBJECTIVE     Objective Measures updated at progress report unless specified.     TREATMENT     Mercedes received the treatments listed below:      therapeutic exercises to develop strength, endurance, ROM, flexibility, posture, and core stabilization for 55 minutes including:  Bike 5 minutes  Bridges 3x10  Straight Leg Raise 3x10  Sidelying Abduction 3x10  Hamstring Curls with swiss ball 3x10  Long Arc Quads 3x12  Lateral and Monster Walks red band 3x10  Heel Raises 3x10  Lateral Heel Taps 3x10  Single Leg RDL to cone 3x10  Leg Press Single Leg 3x10 22#  Single Leg Rebounder with throws 3x10  Prone Quad Stretch [id number]  Gastroc Stretch on wedge [id number]     manual therapy techniques: Joint mobilizations, Myofacial release, and Soft tissue Mobilization were applied to the: right knee for 0 minutes, including:     PATIENT EDUCATION AND HOME EXERCISES      Education provided:   - HEP daily     Written Home  Exercises Provided: yes. Exercises were reviewed and Mercedes was able to demonstrate them prior to the end of the session.  Mercedes demonstrated good  understanding of the education provided. See EMR under Patient Instructions for exercises provided during therapy sessions..    ASSESSMENT     New movements were added today to work on muscle control, strengthening, endurance, and functional movements. Patient did have increased fatigue but overall tolerated session well.     Mercedes Is progressing well towards her goals.   Pt prognosis is Good.     Pt will continue to benefit from skilled outpatient physical therapy to address the deficits listed in the problem list box on initial evaluation, provide pt/family education and to maximize pt's level of independence in the home and community environment.     Pt's spiritual, cultural and educational needs considered and pt agreeable to plan of care and goals.     Anticipated barriers to physical therapy: co-morbidities      Goals:  Short Term Goals: 4 weeks   1. Patient will improve right knee flexion to 130 degrees pain free in order to workout fully.  2. Patient will be independent with HEP in order to further progress and return to maximal function.  3. Pain rating at Worst: 4/10 in order to progress towards increased independence with activity.     Long Term Goals: 8 weeks   1. Patient will improve glute med strength to 4+/5 in order to go up and down stairs.  2. Patient will improve psoas strength to 5/5 in order to jog.  3. Patient will demonstrate normal gait mechanics in order to minimize any compensation and return to PLOF.   4. Patient will self report improvement to 99% on the FOTO Knee Survey.     PLAN     Continue with physical therapy as planned.     Plan of care Certification: 11/7/2023 to 1/7/2024.     John Lui, PT, DPT

## 2023-11-15 ENCOUNTER — CLINICAL SUPPORT (OUTPATIENT)
Dept: REHABILITATION | Facility: HOSPITAL | Age: 38
End: 2023-11-15
Payer: MEDICAID

## 2023-11-15 DIAGNOSIS — R29.898 WEAKNESS OF BOTH HIPS: ICD-10-CM

## 2023-11-15 DIAGNOSIS — M25.561 CHRONIC PAIN OF RIGHT KNEE: Primary | ICD-10-CM

## 2023-11-15 DIAGNOSIS — G89.29 CHRONIC PAIN OF RIGHT KNEE: Primary | ICD-10-CM

## 2023-11-15 PROCEDURE — 97110 THERAPEUTIC EXERCISES: CPT | Mod: PN

## 2023-11-15 NOTE — PROGRESS NOTES
[facility]   Physical Therapy Treatment Note     Name: Mercedes Rosales [place]  Clinic Number: [id number]    Therapy Diagnosis:   Encounter Diagnoses   Name Primary?    Chronic pain of right knee Yes    Weakness of both hips      Physician: Zackery Harper MD    Visit Date: 11/15/2023    Physician Orders: PT Eval and Treat  Medical Diagnosis from Referral:   M22.2X1 (ICD-10-CM) - Patellofemoral disorder, right   R29.898 (ICD-10-CM) - Weakness of both hips   Evaluation Date: 11/7/2023  Authorization Period Expiration: 12/13/2023  Plan of Care Expiration: 1/7/2024  Progress Note Due: 12/7/2023  Visit # / Visits authorized: 2/12 (+1 eval)  FOTO: 1/3     Time In: 9:00am  Time Out: 9:45am  Total Billable Time: 45 minutes    Billing reflects [place] medicaid guidelines, billing all therapy as therapeutic-exercise     Precautions: Standard    SUBJECTIVE     Pt reports: Her right hip is pretty fatigued today.   She was compliant with home exercise program.  Response to previous treatment: good  Functional change: none noted    Pain:  Current 0/10, worst 8/10, best 0/10   Location: right knee    OBJECTIVE     Objective Measures updated at progress report unless specified.     TREATMENT     Mercedes received the treatments listed below:      therapeutic exercises to develop strength, endurance, ROM, flexibility, posture, and core stabilization for 35 minutes including:  Bike 5 minutes  Single Knee to Chest [id number]  Lower Trunk Rotations 30x  Sidelyig Clams 3x10  Reverse Clams with ball 3x10  Bridges 3x10  Straight Leg Raise 3x10  Sidelying Abduction 3x10  Hamstring Curls with swiss ball 3x10  Long Arc Quads 3x12  Lateral and Monster Walks red band 3x10  Heel Raises 3x10  Lateral Heel Taps 3x10  Single Leg RDL to cone 3x10  Leg Press Single Leg 3x10 22#  Single Leg Rebounder with throws 3x10  Long Sitting Hamstring Stretch [id number]  Prone Quad Stretch [id number]  Gastroc Stretch on wedge [id number]      manual therapy techniques: Joint mobilizations, Myofacial release, and Soft tissue Mobilization were applied to the: right knee for 10 minutes, including:  Theraroller to lateral right hip     PATIENT EDUCATION AND HOME EXERCISES      Education provided:   - HEP daily     Written Home Exercises Provided: yes. Exercises were reviewed and Mercedes was able to demonstrate them prior to the end of the session.  Mercedes demonstrated good  understanding of the education provided. See EMR under Patient Instructions for exercises provided during therapy sessions..    ASSESSMENT     Focus for today's session was on strengthening, mobility, and light muscle control. Theraroller was performed to the lateral right hip today to decrease tension. Patient was advised to work on her HEP and stretch daily to help with muscle tension and soreness.     Mercedes Is progressing well towards her goals.   Pt prognosis is Good.     Pt will continue to benefit from skilled outpatient physical therapy to address the deficits listed in the problem list box on initial evaluation, provide pt/family education and to maximize pt's level of independence in the home and community environment.     Pt's spiritual, cultural and educational needs considered and pt agreeable to plan of care and goals.     Anticipated barriers to physical therapy: co-morbidities      Goals:  Short Term Goals: 4 weeks   1. Patient will improve right knee flexion to 130 degrees pain free in order to workout fully.  2. Patient will be independent with HEP in order to further progress and return to maximal function.  3. Pain rating at Worst: 4/10 in order to progress towards increased independence with activity.     Long Term Goals: 8 weeks   1. Patient will improve glute med strength to 4+/5 in order to go up and down stairs.  2. Patient will improve psoas strength to 5/5 in order to jog.  3. Patient will demonstrate normal gait mechanics in order to minimize any  compensation and return to PLOF.   4. Patient will self report improvement to 99% on the FOTO Knee Survey.     PLAN     Continue with physical therapy as planned.     Plan of care Certification: 11/7/2023 to 1/7/2024.     John Lui, PT, DPT

## 2023-11-30 ENCOUNTER — CLINICAL SUPPORT (OUTPATIENT)
Dept: REHABILITATION | Facility: HOSPITAL | Age: 38
End: 2023-11-30
Payer: MEDICAID

## 2023-11-30 DIAGNOSIS — R29.898 WEAKNESS OF BOTH HIPS: ICD-10-CM

## 2023-11-30 DIAGNOSIS — G89.29 CHRONIC PAIN OF RIGHT KNEE: Primary | ICD-10-CM

## 2023-11-30 DIAGNOSIS — M25.561 CHRONIC PAIN OF RIGHT KNEE: Primary | ICD-10-CM

## 2023-11-30 PROCEDURE — 97110 THERAPEUTIC EXERCISES: CPT | Mod: PN

## 2023-11-30 NOTE — PROGRESS NOTES
[facility]   Physical Therapy Treatment Note     Name: Mercedes Rosales Williams  Clinic Number: [id number]    Therapy Diagnosis:   Encounter Diagnoses   Name Primary?    Chronic pain of right knee Yes    Weakness of both hips      Physician: Zackery Harper MD    Visit Date: 11/30/2023    Physician Orders: PT Eval and Treat  Medical Diagnosis from Referral:   M22.2X1 (ICD-10-CM) - Patellofemoral disorder, right   R29.898 (ICD-10-CM) - Weakness of both hips   Evaluation Date: 11/7/2023  Authorization Period Expiration: 12/13/2023  Plan of Care Expiration: 1/7/2024  Progress Note Due: 12/7/2023  Visit # / Visits authorized: 3/12 (+1 eval)  FOTO: 1/3     Time In: 10:00am  Time Out: 11:00am  Total Billable Time: 55 minutes    Billing reflects [place] medicaid guidelines, billing all therapy as therapeutic-exercise     Precautions: Standard    SUBJECTIVE     Pt reports: She is feeling good today and the knee and hip aren't really bothering her.   She was compliant with home exercise program.  Response to previous treatment: good  Functional change: none noted    Pain:  Current 0/10, worst 8/10, best 0/10   Location: right knee    OBJECTIVE     Objective Measures updated at progress report unless specified.     TREATMENT     Mercedes received the treatments listed below:      therapeutic exercises to develop strength, endurance, ROM, flexibility, posture, and core stabilization for 55 minutes including:  Bike 5 minutes  Single Knee to Chest [id number]  Lower Trunk Rotations 30x  Sidelying Clams 3x10  Reverse Clams with ball 3x10  Bridges 3x10  Straight Leg Raise 3x10  Sidelying Abduction 3x10  Hamstring Curls with swiss ball 3x10  Long Arc Quads 3x12  Lateral and Monster Walks red band 3x10  Heel Raises 3x10  Lateral Heel Taps 3x10  Single Leg RDL to cone 3x10  Leg Press Single Leg 3x10 22#  Single Leg Rebounder with throws 3x10  Long Sitting Hamstring Stretch [id number]  Prone Quad Stretch  [id number]  Gastroc Stretch on wedge [id number]     manual therapy techniques: Joint mobilizations, Myofacial release, and Soft tissue Mobilization were applied to the: right knee for 0 minutes, including:  Theraroller to lateral right hip     PATIENT EDUCATION AND HOME EXERCISES      Education provided:   - HEP daily     Written Home Exercises Provided: yes. Exercises were reviewed and Mercedes was able to demonstrate them prior to the end of the session.  Mercedes demonstrated good  understanding of the education provided. See EMR under Patient Instructions for exercises provided during therapy sessions..    ASSESSMENT     Patient tolerated today's session very well. Her endurance is improving as she was able to perform more movements during the session. She was advised to keep stretching to help with mobility and soreness.     Mercedes Is progressing well towards her goals.   Pt prognosis is Good.     Pt will continue to benefit from skilled outpatient physical therapy to address the deficits listed in the problem list box on initial evaluation, provide pt/family education and to maximize pt's level of independence in the home and community environment.     Pt's spiritual, cultural and educational needs considered and pt agreeable to plan of care and goals.     Anticipated barriers to physical therapy: co-morbidities      Goals:  Short Term Goals: 4 weeks   1. Patient will improve right knee flexion to 130 degrees pain free in order to workout fully.  2. Patient will be independent with HEP in order to further progress and return to maximal function.  3. Pain rating at Worst: 4/10 in order to progress towards increased independence with activity.     Long Term Goals: 8 weeks   1. Patient will improve glute med strength to 4+/5 in order to go up and down stairs.  2. Patient will improve psoas strength to 5/5 in order to jog.  3. Patient will demonstrate normal gait mechanics in order to minimize any compensation and  return to PLOF.   4. Patient will self report improvement to 99% on the FOTO Knee Survey.     PLAN     Continue with physical therapy as planned.     Plan of care Certification: 11/7/2023 to 1/7/2024.     John Lui, PT, DPT

## 2023-12-07 ENCOUNTER — CLINICAL SUPPORT (OUTPATIENT)
Dept: REHABILITATION | Facility: HOSPITAL | Age: 38
End: 2023-12-07
Payer: MEDICAID

## 2023-12-07 DIAGNOSIS — M25.561 CHRONIC PAIN OF RIGHT KNEE: Primary | ICD-10-CM

## 2023-12-07 DIAGNOSIS — G89.29 CHRONIC PAIN OF RIGHT KNEE: Primary | ICD-10-CM

## 2023-12-07 DIAGNOSIS — R29.898 WEAKNESS OF BOTH HIPS: ICD-10-CM

## 2023-12-07 PROCEDURE — 97110 THERAPEUTIC EXERCISES: CPT | Mod: PN,CQ

## 2023-12-07 NOTE — PROGRESS NOTES
[facility]   Physical Therapy Treatment Note     Name: Mercedes Rosales [place]  Clinic Number: [id number]    Therapy Diagnosis:   Encounter Diagnoses   Name Primary?    Chronic pain of right knee Yes    Weakness of both hips      Physician: Zackery Harper MD    Visit Date: 12/7/2023    Physician Orders: PT Eval and Treat  Medical Diagnosis from Referral:   M22.2X1 (ICD-10-CM) - Patellofemoral disorder, right   R29.898 (ICD-10-CM) - Weakness of both hips   Evaluation Date: 11/7/2023  Authorization Period Expiration: 12/13/2023  Plan of Care Expiration: 1/7/2024  Progress Note Due: 12/7/2023  Visit # / Visits authorized: 4/12 (+1 eval)  FOTO: 1/3     Time In: 9:15am  Time Out: 10:00am  Total Billable Time: 45 minutes    Billing reflects [place] medicaid guidelines, billing all therapy as therapeutic-exercise     Precautions: Standard    SUBJECTIVE     Pt reports: No pain.   She was compliant with home exercise program.  Response to previous treatment: good  Functional change: none noted    Pain:  Current 0/10, worst 8/10, best 0/10   Location: right knee    OBJECTIVE     Objective Measures updated at progress report unless specified.     TREATMENT     Mercedes received the treatments listed below:      therapeutic exercises to develop strength, endurance, ROM, flexibility, posture, and core stabilization for 45 minutes including:  Bike 5 minutes  Single Knee to Chest [id number]  Lower Trunk Rotations 30x  Sidelying Clams 3x10  Reverse Clams with ball 3x10  Bridges 3x10  Straight Leg Raise 3x10  Sidelying Abduction 3x10  Hamstring Curls with swiss ball 3x10  Long Arc Quads 3x12  Lateral and Monster Walks red band 3x10  Heel Raises 3x10  Lateral Heel Taps 3x10  Single Leg RDL to cone 3x10  Leg Press Single Leg 3x10 22#  Single Leg Rebounder with throws 3x10  Long Sitting Hamstring Stretch [id number]  Prone Quad Stretch [id number]  Gastroc Stretch on wedge [id number]     manual therapy techniques:  Joint mobilizations, Myofacial release, and Soft tissue Mobilization were applied to the: right knee for 0 minutes, including:  Theraroller to lateral right hip     PATIENT EDUCATION AND HOME EXERCISES      Education provided:   - HEP daily     Written Home Exercises Provided: yes. Exercises were reviewed and Mercedes was able to demonstrate them prior to the end of the session.  Mercedes demonstrated good  understanding of the education provided. See EMR under Patient Instructions for exercises provided during therapy sessions..    ASSESSMENT     Patient tolerated today's session very well. Continued with interventions targeting hip and knee strength and ROM. She was advised to keep working on HEP.    Mercedes Is progressing well towards her goals.   Pt prognosis is Good.     Pt will continue to benefit from skilled outpatient physical therapy to address the deficits listed in the problem list box on initial evaluation, provide pt/family education and to maximize pt's level of independence in the home and community environment.     Pt's spiritual, cultural and educational needs considered and pt agreeable to plan of care and goals.     Anticipated barriers to physical therapy: co-morbidities      Goals:  Short Term Goals: 4 weeks   1. Patient will improve right knee flexion to 130 degrees pain free in order to workout fully.  2. Patient will be independent with HEP in order to further progress and return to maximal function.  3. Pain rating at Worst: 4/10 in order to progress towards increased independence with activity.     Long Term Goals: 8 weeks   1. Patient will improve glute med strength to 4+/5 in order to go up and down stairs.  2. Patient will improve psoas strength to 5/5 in order to jog.  3. Patient will demonstrate normal gait mechanics in order to minimize any compensation and return to PLOF.   4. Patient will self report improvement to 99% on the FOTO Knee Survey.     PLAN     Continue with physical  therapy as planned.     Plan of care Certification: 11/7/2023 to 1/7/2024.     Hayder Barakat, PTA

## 2023-12-12 ENCOUNTER — CLINICAL SUPPORT (OUTPATIENT)
Dept: REHABILITATION | Facility: HOSPITAL | Age: 38
End: 2023-12-12
Payer: MEDICAID

## 2023-12-12 DIAGNOSIS — M25.561 CHRONIC PAIN OF RIGHT KNEE: Primary | ICD-10-CM

## 2023-12-12 DIAGNOSIS — G89.29 CHRONIC PAIN OF RIGHT KNEE: Primary | ICD-10-CM

## 2023-12-12 DIAGNOSIS — R29.898 WEAKNESS OF BOTH HIPS: ICD-10-CM

## 2023-12-12 PROCEDURE — 97110 THERAPEUTIC EXERCISES: CPT | Mod: PN

## 2023-12-12 NOTE — PROGRESS NOTES
[facility]   Physical Therapy Treatment Note     Name: Mercedes Rosales [place]  Clinic Number: [id number]    Therapy Diagnosis:   Encounter Diagnoses   Name Primary?    Chronic pain of right knee Yes    Weakness of both hips      Physician: Zackery Harper MD    Visit Date: 12/12/2023    Physician Orders: PT Eval and Treat  Medical Diagnosis from Referral:   M22.2X1 (ICD-10-CM) - Patellofemoral disorder, right   R29.898 (ICD-10-CM) - Weakness of both hips   Evaluation Date: 11/7/2023  Authorization Period Expiration: 12/13/2023  Plan of Care Expiration: 1/7/2024  Progress Note Due: 12/7/2023  Visit # / Visits authorized: 5/12 (+1 eval)  FOTO: 1/3     Time In: 9:00am  Time Out: 10:00am  Total Billable Time: 55 minutes    Billing reflects [place] medicaid guidelines, billing all therapy as therapeutic-exercise     Precautions: Standard    SUBJECTIVE     Pt reports: She is feeling good, no issues.   She was compliant with home exercise program.  Response to previous treatment: good  Functional change: none noted    Pain:  Current 0/10, worst 8/10, best 0/10   Location: right knee    OBJECTIVE     Objective Measures updated at progress report unless specified.     TREATMENT     Mercedes received the treatments listed below:      therapeutic exercises to develop strength, endurance, ROM, flexibility, posture, and core stabilization for 55 minutes including:  Bike 5 minutes  Single Knee to Chest [id number]  Lower Trunk Rotations 30x  Sidelying Clams with ball 3x10  Reverse Clams with ball 3x10  Bridges 3x10  Straight Leg Raise 3x10  Sidelying Abduction 3x10  Hamstring Curls with swiss ball 3x10  Long Arc Quads 3x12  Lateral and Monster Walks red band 3x10  Heel Raises 3x10  Lateral Heel Taps 3x10  Single Leg RDL to cone 3x10  Leg Press Single Leg 3x10 22#  Single Leg Rebounder with throws 3x10  Long Sitting Hamstring Stretch [id number]  Prone Quad Stretch [id number]  Gastroc Stretch on wedge  [id number]     manual therapy techniques: Joint mobilizations, Myofacial release, and Soft tissue Mobilization were applied to the: right knee for 0 minutes, including:  Theraroller to lateral right hip     PATIENT EDUCATION AND HOME EXERCISES      Education provided:   - HEP daily     Written Home Exercises Provided: yes. Exercises were reviewed and Mercedes was able to demonstrate them prior to the end of the session.  Mercedes demonstrated good  understanding of the education provided. See EMR under Patient Instructions for exercises provided during therapy sessions..    ASSESSMENT     Patient demonstrates increased control. Patient was advised to ease into more workouts to see how limited she is. She tolerated today's session very well.     Mercedes Is progressing well towards her goals.   Pt prognosis is Good.     Pt will continue to benefit from skilled outpatient physical therapy to address the deficits listed in the problem list box on initial evaluation, provide pt/family education and to maximize pt's level of independence in the home and community environment.     Pt's spiritual, cultural and educational needs considered and pt agreeable to plan of care and goals.     Anticipated barriers to physical therapy: co-morbidities      Goals:  Short Term Goals: 4 weeks   1. Patient will improve right knee flexion to 130 degrees pain free in order to workout fully.  2. Patient will be independent with HEP in order to further progress and return to maximal function.  3. Pain rating at Worst: 4/10 in order to progress towards increased independence with activity.     Long Term Goals: 8 weeks   1. Patient will improve glute med strength to 4+/5 in order to go up and down stairs.  2. Patient will improve psoas strength to 5/5 in order to jog.  3. Patient will demonstrate normal gait mechanics in order to minimize any compensation and return to PLOF.   4. Patient will self report improvement to 99% on the FOTO Knee Survey.      PLAN     Continue with physical therapy as planned.     Plan of care Certification: 11/7/2023 to 1/7/2024.     John Lui, PT, DPT

## 2023-12-14 ENCOUNTER — CLINICAL SUPPORT (OUTPATIENT)
Dept: REHABILITATION | Facility: HOSPITAL | Age: 38
End: 2023-12-14
Payer: MEDICAID

## 2023-12-14 DIAGNOSIS — R29.898 WEAKNESS OF BOTH HIPS: ICD-10-CM

## 2023-12-14 DIAGNOSIS — M25.561 CHRONIC PAIN OF RIGHT KNEE: Primary | ICD-10-CM

## 2023-12-14 DIAGNOSIS — G89.29 CHRONIC PAIN OF RIGHT KNEE: Primary | ICD-10-CM

## 2023-12-14 PROCEDURE — 97110 THERAPEUTIC EXERCISES: CPT | Mod: PN,CQ

## 2023-12-14 NOTE — PROGRESS NOTES
[facility]   Physical Therapy Treatment Note     Name: Mercedes Rosales Jones  Clinic Number: [id number]    Therapy Diagnosis:   Encounter Diagnoses   Name Primary?    Chronic pain of right knee Yes    Weakness of both hips      Physician: No ref. provider found    Visit Date: 12/14/2023    Physician Orders: PT Eval and Treat  Medical Diagnosis from Referral:   M22.2X1 (ICD-10-CM) - Patellofemoral disorder, right   R29.898 (ICD-10-CM) - Weakness of both hips   Evaluation Date: 11/7/2023  Authorization Period Expiration: 12/13/2023  Plan of Care Expiration: 1/7/2024  Progress Note Due: 12/7/2023  Visit # / Visits authorized: 7/12 (+1 eval)  FOTO: 1/3     Time In: 8:30am  Time Out: 9:15am  Total Billable Time: 45 minutes    Billing reflects [place] medicaid guidelines, billing all therapy as therapeutic-exercise     Precautions: Standard    SUBJECTIVE     Pt reports: She is feeling good.   She was compliant with home exercise program.  Response to previous treatment: good  Functional change: none noted    Pain:  Current 0/10, worst 8/10, best 0/10   Location: right knee    OBJECTIVE     Objective Measures updated at progress report unless specified.     TREATMENT     Mercedes received the treatments listed below:      therapeutic exercises to develop strength, endurance, ROM, flexibility, posture, and core stabilization for 45 minutes including:  Bike 5 minutes  Single Knee to Chest [id number]  Lower Trunk Rotations 30x  Sidelying Clams with ball 3x10  Reverse Clams with ball 3x10  Bridges on SB3x10  Straight Leg Raise 3x10  Sidelying Abduction 3x10  Hamstring Curls with swiss ball 3x10  Long Arc Quads 3x12  Lateral and Monster Walks green band 3x10  Heel Raises 3x10  Lateral Heel Taps 3x10  Single Leg RDL w/ 5# KB, 3x10  Leg Press Single Leg 3x10 22#  Single Leg Rebounder with throws 3x10  Long Sitting Hamstring Stretch [id number]  Prone Quad Stretch [id number]  Gastroc Stretch on wedge [id number]      manual therapy techniques: Joint mobilizations, Myofacial release, and Soft tissue Mobilization were applied to the: right knee for 0 minutes, including:  Theraroller to lateral right hip     PATIENT EDUCATION AND HOME EXERCISES      Education provided:   - HEP daily     Written Home Exercises Provided: yes. Exercises were reviewed and Mercedes was able to demonstrate them prior to the end of the session.  Mercedes demonstrated good  understanding of the education provided. See EMR under Patient Instructions for exercises provided during therapy sessions..    ASSESSMENT     Patient demonstrates increased control. She was able to progress in several interventions without issue. She tolerated today's session very well.     Mercedes Is progressing well towards her goals.   Pt prognosis is Good.     Pt will continue to benefit from skilled outpatient physical therapy to address the deficits listed in the problem list box on initial evaluation, provide pt/family education and to maximize pt's level of independence in the home and community environment.     Pt's spiritual, cultural and educational needs considered and pt agreeable to plan of care and goals.     Anticipated barriers to physical therapy: co-morbidities      Goals:  Short Term Goals: 4 weeks   1. Patient will improve right knee flexion to 130 degrees pain free in order to workout fully.  2. Patient will be independent with HEP in order to further progress and return to maximal function.  3. Pain rating at Worst: 4/10 in order to progress towards increased independence with activity.     Long Term Goals: 8 weeks   1. Patient will improve glute med strength to 4+/5 in order to go up and down stairs.  2. Patient will improve psoas strength to 5/5 in order to jog.  3. Patient will demonstrate normal gait mechanics in order to minimize any compensation and return to PLOF.   4. Patient will self report improvement to 99% on the FOTO Knee Survey.     PLAN      Continue with physical therapy as planned.     Plan of care Certification: 11/7/2023 to 1/7/2024.     Hayder Barakat, PTA